# Patient Record
Sex: MALE | Race: WHITE
[De-identification: names, ages, dates, MRNs, and addresses within clinical notes are randomized per-mention and may not be internally consistent; named-entity substitution may affect disease eponyms.]

---

## 2019-02-15 ENCOUNTER — HOSPITAL ENCOUNTER (OUTPATIENT)
Dept: HOSPITAL 39 - MRI | Age: 29
End: 2019-02-15
Attending: NURSE PRACTITIONER
Payer: COMMERCIAL

## 2019-02-15 DIAGNOSIS — M89.8X2: ICD-10-CM

## 2019-02-15 DIAGNOSIS — M25.512: Primary | ICD-10-CM

## 2019-02-15 NOTE — MRI
EXAM DESCRIPTION: 



MRI left shoulder



CLINICAL HISTORY: 



Shoulder pain. Limited range of motion



COMPARISON: 



None.



TECHNIQUE: 



Multiplanar, multisequence MR images of the left shoulder



FINDINGS: 



Mass lesion, well-circumscribed ovoid located within the central

proximal humeral metaphysis. Approximate measurements 2.9 cm

craniocaudal with bidimensional measurements 2.2 x 2 cm.

Lobulated periphery, intermediate T1 hyperintense PD and T2 with

punctate low signal intensity foci throughout the lesion. No

surrounding marrow edema. Appearance consistent with low-grade

chondroid lesion/enchondroma.



Acromioclavicular joint is normal. Mild lateral downsloping of

the acromion. Minimal edema in the acromion which may be

stress-related. No stress fracture. Marrow signal otherwise

normal



Mild bursal surface edema over the supraspinatus. No intrinsic

tendon signal abnormality. Normal muscle volume and signal



Infraspinatus, teres minor and subscapularis tendons and muscles

are normal



Long head biceps tendon normal in the bicipital groove and

intra-articular. Labral anchor intact. No labral tear



No high-grade glenohumeral chondrosis or chronic osteochondral

lesion



IMPRESSION: 



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

metaphysis. No aggressive imaging features. No surrounding edema



Minimal edema in the acromion which may be stress-related



Mild bursal surface edema over the supraspinatus. No tendinosis

or tendon tear



Electronically signed by:  Rudy Freeman MD  2/15/2019 11:27

AM CST Workstation: [id number]